# Patient Record
Sex: MALE | Race: AMERICAN INDIAN OR ALASKA NATIVE | Employment: FULL TIME | URBAN - METROPOLITAN AREA
[De-identification: names, ages, dates, MRNs, and addresses within clinical notes are randomized per-mention and may not be internally consistent; named-entity substitution may affect disease eponyms.]

---

## 2017-01-27 ENCOUNTER — OFFICE VISIT (OUTPATIENT)
Dept: INTERNAL MEDICINE | Facility: CLINIC | Age: 37
End: 2017-01-27
Payer: COMMERCIAL

## 2017-01-27 VITALS
WEIGHT: 200 LBS | OXYGEN SATURATION: 97 % | SYSTOLIC BLOOD PRESSURE: 110 MMHG | TEMPERATURE: 97.8 F | HEIGHT: 71 IN | BODY MASS INDEX: 28 KG/M2 | HEART RATE: 86 BPM | DIASTOLIC BLOOD PRESSURE: 70 MMHG

## 2017-01-27 DIAGNOSIS — J01.01 ACUTE RECURRENT MAXILLARY SINUSITIS: Primary | ICD-10-CM

## 2017-01-27 PROCEDURE — 99213 OFFICE O/P EST LOW 20 MIN: CPT | Performed by: INTERNAL MEDICINE

## 2017-01-27 RX ORDER — FLUTICASONE PROPIONATE 50 MCG
1-2 SPRAY, SUSPENSION (ML) NASAL DAILY
Qty: 1 BOTTLE | Refills: 11 | Status: SHIPPED | OUTPATIENT
Start: 2017-01-27 | End: 2018-01-01

## 2017-01-27 NOTE — PROGRESS NOTES
"  SUBJECTIVE:                                                    Jose Lantigua is a 36 year old male who presents to clinic today for the following health issues:      Sinus infections/ tonsillitis     Presents with concern for recurrent pharyngitis, tonsillitis. Has had 2 -3 episodes per season. Has chronic nasal congestion. No current acute symptoms of cough, sore throat, SOB, HA.   No h/o pneumonias, no complications from prior infections.       Problem list and histories reviewed & adjusted, as indicated.  Additional history: none    Problem list, Medication list, Allergies, and Medical/Social/Surgical histories reviewed in EPIC and updated as appropriate.    ROS:  Constitutional, HEENT, cardiovascular, pulmonary, gi and gu systems are negative, except as otherwise noted.    OBJECTIVE:                                                    /70 mmHg  Pulse 86  Temp(Src) 97.8  F (36.6  C) (Oral)  Ht 5' 10.5\" (1.791 m)  Wt 200 lb (90.719 kg)  BMI 28.28 kg/m2  SpO2 97%  Body mass index is 28.28 kg/(m^2).  GENERAL: healthy, alert and no distress  NECK: no adenopathy, no asymmetry, masses, or scars and thyroid normal to palpation  RESP: lungs clear to auscultation - no rales, rhonchi or wheezes  CV: regular rate and rhythm, normal S1 S2, no S3 or S4, no murmur, click or rub, no peripheral edema and peripheral pulses strong  ABDOMEN: soft, nontender, no hepatosplenomegaly, no masses and bowel sounds normal  MS: no gross musculoskeletal defects noted, no edema    Diagnostic Test Results:  none      ASSESSMENT/PLAN:                                                      Problem List Items Addressed This Visit     None      Visit Diagnoses     Acute recurrent maxillary sinusitis    -  Primary     Relevant Medications     fluticasone (FLONASE) 50 MCG/ACT spray     Other Relevant Orders     OTOLARYNGOLOGY REFERRAL            Start on nasal steroid  Refer to ENT     Follow-Up:as needed     Musa Florez, " MD  [facility]

## 2017-01-27 NOTE — NURSING NOTE
"Chief Complaint   Patient presents with     Consult     about tonsilectomy       Initial /70 mmHg  Pulse 86  Temp(Src) 97.8  F (36.6  C) (Oral)  Ht 5' 10.5\" (1.791 m)  Wt 200 lb (90.719 kg)  BMI 28.28 kg/m2  SpO2 97% Estimated body mass index is 28.28 kg/(m^2) as calculated from the following:    Height as of this encounter: 5' 10.5\" (1.791 m).    Weight as of this encounter: 200 lb (90.719 kg).  BP completed using cuff size: large    "

## 2017-01-27 NOTE — MR AVS SNAPSHOT
After Visit Summary   1/27/2017    Jose Lantigua    MRN: [id number]           Patient Information     Date Of Birth          1980        Visit Information        Provider Department      1/27/2017 3:40 PM Musa Florez MD [facility]        Today's Diagnoses     Acute recurrent maxillary sinusitis    -  1        Follow-ups after your visit        Additional Services     OTOLARYNGOLOGY REFERRAL       Your provider has referred you to: N: [facility] [phone]   [email or web address]    Please be aware that coverage of these services is subject to the terms and limitations of your health insurance plan.  Call member services at your health plan with any benefit or coverage questions.      Please bring the following with you to your appointment:    (1) Any X-Rays, CTs or MRIs which have been performed.  Contact the facility where they were done to arrange for  prior to your scheduled appointment.   (2) List of current medications  (3) This referral request   (4) Any documents/labs given to you for this referral                  Who to contact     If you have questions or need follow up information about today's clinic visit or your schedule please contact [facility] directly at [phone].  Normal or non-critical lab and imaging results will be communicated to you by MyChart, letter or phone within 4 business days after the clinic has received the results. If you do not hear from us within 7 days, please contact the clinic through MyChart or phone. If you have a critical or abnormal lab result, we will notify you by phone as soon as possible.  Submit refill requests through "[employer] or call your pharmacy and they will forward the refill request to us. Please allow 3 business days for your refill to be completed.          Additional Information About Your Visit       "  Aunt Kitchenhart Information     [employer] lets you send messages to your doctor, view your test results, renew your prescriptions, schedule appointments and more. To sign up, go to [email or web address] . Click on \"Log in\" on the left side of the screen, which will take you to the Welcome page. Then click on \"Sign up Now\" on the right side of the page.     You will be asked to enter the access code listed below, as well as some personal information. Please follow the directions to create your username and password.     Your access code is: [email or web address]  Expires: 2017 12:03 PM     Your access code will  in 90 days. If you need help or a new code, please call your [place] clinic or [phone].        Care EveryWhere ID     This is your Care EveryWhere ID. This could be used by other organizations to access your [place] medical records  [id number]        Your Vitals Were     Pulse Temperature Height BMI (Body Mass Index) Pulse Oximetry       86 97.8  F (36.6  C) (Oral) 5' 10.5\" (1.791 m) 28.28 kg/m2 97%        Blood Pressure from Last 3 Encounters:   17 110/70   16 151/95   11/15/16 116/84    Weight from Last 3 Encounters:   17 200 lb (90.719 kg)   11/15/16 199 lb (90.266 kg)   16 199 lb (90.266 kg)              We Performed the Following     OTOLARYNGOLOGY REFERRAL          Today's Medication Changes          These changes are accurate as of: 17  4:09 PM.  If you have any questions, ask your nurse or doctor.               Start taking these medicines.        Dose/Directions    fluticasone 50 MCG/ACT spray   Commonly known as:  FLONASE   Used for:  Acute recurrent maxillary sinusitis   Started by:  Musa Florez MD        Dose:  1-2 spray   Spray 1-2 sprays into both nostrils daily   Quantity:  1 Bottle   Refills:  11            Where to get your medicines      These medications were sent to [place] [address] [place] [facility], [place] - [address]  [address], " [facility] [address]     Phone:  [phone]    - fluticasone 50 MCG/ACT spray             Primary Care Provider Office Phone # Fax #    Musa Florez [phone]       [facility] [address] [facility] [address]        Thank you!     Thank you for choosing [facility]  for your care. Our goal is always to provide you with excellent care. Hearing back from our patients is one way we can continue to improve our services. Please take a few minutes to complete the written survey that you may receive in the mail after your visit with us. Thank you!             Your Updated Medication List - Protect others around you: Learn how to safely use, store and throw away your medicines at www.disposemymeds.org.          This list is accurate as of: 1/27/17  4:09 PM.  Always use your most recent med list.                   Brand Name Dispense Instructions for use    fluticasone 50 MCG/ACT spray    FLONASE    1 Bottle    Spray 1-2 sprays into both nostrils daily       ibuprofen 200 MG tablet    ADVIL/MOTRIN     Take 200 mg by mouth every 4 hours as needed for mild pain

## 2018-01-01 ENCOUNTER — HOSPITAL ENCOUNTER (OUTPATIENT)
Dept: CT IMAGING | Facility: CLINIC | Age: 38
Discharge: HOME | End: 2018-12-05
Attending: INTERNAL MEDICINE | Admitting: INTERNAL MEDICINE
Payer: COMMERCIAL

## 2018-01-01 ENCOUNTER — TELEPHONE (OUTPATIENT)
Dept: ORTHOPEDICS | Facility: CLINIC | Age: 38
End: 2018-01-01

## 2018-01-01 ENCOUNTER — TELEPHONE (OUTPATIENT)
Dept: INTERNAL MEDICINE | Facility: CLINIC | Age: 38
End: 2018-01-01

## 2018-01-01 ENCOUNTER — OFFICE VISIT (OUTPATIENT)
Dept: INTERNAL MEDICINE | Facility: CLINIC | Age: 38
End: 2018-01-01
Payer: COMMERCIAL

## 2018-01-01 ENCOUNTER — MYC REFILL (OUTPATIENT)
Dept: INTERNAL MEDICINE | Facility: CLINIC | Age: 38
End: 2018-01-01

## 2018-01-01 VITALS
RESPIRATION RATE: 20 BRPM | SYSTOLIC BLOOD PRESSURE: 120 MMHG | TEMPERATURE: 98.2 F | WEIGHT: 223 LBS | HEART RATE: 107 BPM | HEIGHT: 71 IN | OXYGEN SATURATION: 99 % | DIASTOLIC BLOOD PRESSURE: 90 MMHG | BODY MASS INDEX: 31.22 KG/M2

## 2018-01-01 VITALS
WEIGHT: 227 LBS | BODY MASS INDEX: 32.5 KG/M2 | DIASTOLIC BLOOD PRESSURE: 80 MMHG | TEMPERATURE: 98.1 F | SYSTOLIC BLOOD PRESSURE: 124 MMHG | OXYGEN SATURATION: 98 % | HEART RATE: 88 BPM | RESPIRATION RATE: 16 BRPM | HEIGHT: 70 IN

## 2018-01-01 VITALS
DIASTOLIC BLOOD PRESSURE: 68 MMHG | WEIGHT: 229 LBS | TEMPERATURE: 97.8 F | HEART RATE: 98 BPM | BODY MASS INDEX: 32.78 KG/M2 | SYSTOLIC BLOOD PRESSURE: 108 MMHG | HEIGHT: 70 IN | OXYGEN SATURATION: 99 %

## 2018-01-01 DIAGNOSIS — I10 ESSENTIAL HYPERTENSION, BENIGN: ICD-10-CM

## 2018-01-01 DIAGNOSIS — Z11.3 SCREEN FOR STD (SEXUALLY TRANSMITTED DISEASE): ICD-10-CM

## 2018-01-01 DIAGNOSIS — Z09 HOSPITAL DISCHARGE FOLLOW-UP: ICD-10-CM

## 2018-01-01 DIAGNOSIS — R10.32 ABDOMINAL PAIN, LEFT LOWER QUADRANT: Primary | ICD-10-CM

## 2018-01-01 DIAGNOSIS — M54.50 MIDLINE LOW BACK PAIN WITHOUT SCIATICA, UNSPECIFIED CHRONICITY: ICD-10-CM

## 2018-01-01 DIAGNOSIS — R16.1 SPLENOMEGALY: ICD-10-CM

## 2018-01-01 DIAGNOSIS — M54.50 ACUTE BILATERAL LOW BACK PAIN WITHOUT SCIATICA: Primary | ICD-10-CM

## 2018-01-01 DIAGNOSIS — R10.32 ABDOMINAL PAIN, LEFT LOWER QUADRANT: ICD-10-CM

## 2018-01-01 DIAGNOSIS — F41.9 ANXIETY: ICD-10-CM

## 2018-01-01 LAB
ALBUMIN SERPL-MCNC: 3.8 G/DL (ref 3.4–5)
ALBUMIN UR-MCNC: NEGATIVE MG/DL
ALP SERPL-CCNC: 68 U/L (ref 40–150)
ALT SERPL W P-5'-P-CCNC: 51 U/L (ref 0–70)
ANION GAP SERPL CALCULATED.3IONS-SCNC: 5 MMOL/L (ref 3–14)
ANION GAP SERPL CALCULATED.3IONS-SCNC: 9 MMOL/L (ref 3–14)
APPEARANCE UR: CLEAR
AST SERPL W P-5'-P-CCNC: 23 U/L (ref 0–45)
BILIRUB SERPL-MCNC: 0.5 MG/DL (ref 0.2–1.3)
BILIRUB UR QL STRIP: NEGATIVE
BUN SERPL-MCNC: 27 MG/DL (ref 7–30)
BUN SERPL-MCNC: 29 MG/DL (ref 7–30)
C TRACH DNA SPEC QL NAA+PROBE: NEGATIVE
CALCIUM SERPL-MCNC: 8.5 MG/DL (ref 8.5–10.1)
CALCIUM SERPL-MCNC: 9.4 MG/DL (ref 8.5–10.1)
CHLORIDE SERPL-SCNC: 103 MMOL/L (ref 94–109)
CHLORIDE SERPL-SCNC: 105 MMOL/L (ref 94–109)
CO2 SERPL-SCNC: 28 MMOL/L (ref 20–32)
CO2 SERPL-SCNC: 29 MMOL/L (ref 20–32)
COLOR UR AUTO: YELLOW
CREAT SERPL-MCNC: 1.27 MG/DL (ref 0.66–1.25)
CREAT SERPL-MCNC: 1.27 MG/DL (ref 0.66–1.25)
ERYTHROCYTE [DISTWIDTH] IN BLOOD BY AUTOMATED COUNT: 13.9 % (ref 10–15)
ERYTHROCYTE [DISTWIDTH] IN BLOOD BY AUTOMATED COUNT: 14 % (ref 10–15)
GFR SERPL CREATININE-BSD FRML MDRD: 63 ML/MIN/1.7M2
GFR SERPL CREATININE-BSD FRML MDRD: 63 ML/MIN/1.7M2
GLUCOSE SERPL-MCNC: 66 MG/DL (ref 70–99)
GLUCOSE SERPL-MCNC: 97 MG/DL (ref 70–99)
GLUCOSE UR STRIP-MCNC: NEGATIVE MG/DL
HCT VFR BLD AUTO: 42.4 % (ref 40–53)
HCT VFR BLD AUTO: 45.1 % (ref 40–53)
HCV AB SERPL QL IA: NONREACTIVE
HGB BLD-MCNC: 15.1 G/DL (ref 13.3–17.7)
HGB BLD-MCNC: 15.2 G/DL (ref 13.3–17.7)
HGB UR QL STRIP: NEGATIVE
HIV 1+2 AB+HIV1 P24 AG SERPL QL IA: NONREACTIVE
KETONES UR STRIP-MCNC: NEGATIVE MG/DL
LEUKOCYTE ESTERASE UR QL STRIP: NEGATIVE
LIPASE SERPL-CCNC: 153 U/L (ref 73–393)
MCH RBC QN AUTO: 28.9 PG (ref 26.5–33)
MCH RBC QN AUTO: 30.3 PG (ref 26.5–33)
MCHC RBC AUTO-ENTMCNC: 33.7 G/DL (ref 31.5–36.5)
MCHC RBC AUTO-ENTMCNC: 35.6 G/DL (ref 31.5–36.5)
MCV RBC AUTO: 85 FL (ref 78–100)
MCV RBC AUTO: 86 FL (ref 78–100)
N GONORRHOEA DNA SPEC QL NAA+PROBE: NEGATIVE
NITRATE UR QL: NEGATIVE
PH UR STRIP: 5.5 PH (ref 5–7)
PLATELET # BLD AUTO: 123 10E9/L (ref 150–450)
PLATELET # BLD AUTO: 153 10E9/L (ref 150–450)
POTASSIUM SERPL-SCNC: 3.8 MMOL/L (ref 3.4–5.3)
POTASSIUM SERPL-SCNC: 4 MMOL/L (ref 3.4–5.3)
PROT SERPL-MCNC: 7.4 G/DL (ref 6.8–8.8)
RBC # BLD AUTO: 4.99 10E12/L (ref 4.4–5.9)
RBC # BLD AUTO: 5.26 10E12/L (ref 4.4–5.9)
SODIUM SERPL-SCNC: 138 MMOL/L (ref 133–144)
SODIUM SERPL-SCNC: 141 MMOL/L (ref 133–144)
SOURCE: NORMAL
SP GR UR STRIP: 1.02 (ref 1–1.03)
SPECIMEN SOURCE: NORMAL
SPECIMEN SOURCE: NORMAL
T PALLIDUM AB SER QL: NONREACTIVE
UROBILINOGEN UR STRIP-ACNC: 0.2 EU/DL (ref 0.2–1)
WBC # BLD AUTO: 5.8 10E9/L (ref 4–11)
WBC # BLD AUTO: 6 10E9/L (ref 4–11)

## 2018-01-01 PROCEDURE — 74177 CT ABD & PELVIS W/CONTRAST: CPT

## 2018-01-01 PROCEDURE — 86803 HEPATITIS C AB TEST: CPT | Performed by: INTERNAL MEDICINE

## 2018-01-01 PROCEDURE — 87591 N.GONORRHOEAE DNA AMP PROB: CPT | Performed by: INTERNAL MEDICINE

## 2018-01-01 PROCEDURE — 36415 COLL VENOUS BLD VENIPUNCTURE: CPT | Performed by: INTERNAL MEDICINE

## 2018-01-01 PROCEDURE — 85027 COMPLETE CBC AUTOMATED: CPT | Performed by: INTERNAL MEDICINE

## 2018-01-01 PROCEDURE — 86780 TREPONEMA PALLIDUM: CPT | Performed by: INTERNAL MEDICINE

## 2018-01-01 PROCEDURE — 25000128 H RX IP 250 OP 636: Performed by: RADIOLOGY

## 2018-01-01 PROCEDURE — 81003 URINALYSIS AUTO W/O SCOPE: CPT | Performed by: INTERNAL MEDICINE

## 2018-01-01 PROCEDURE — 99214 OFFICE O/P EST MOD 30 MIN: CPT | Performed by: INTERNAL MEDICINE

## 2018-01-01 PROCEDURE — 87491 CHLMYD TRACH DNA AMP PROBE: CPT | Performed by: INTERNAL MEDICINE

## 2018-01-01 PROCEDURE — 83690 ASSAY OF LIPASE: CPT | Performed by: INTERNAL MEDICINE

## 2018-01-01 PROCEDURE — 87389 HIV-1 AG W/HIV-1&-2 AB AG IA: CPT | Performed by: INTERNAL MEDICINE

## 2018-01-01 PROCEDURE — 80053 COMPREHEN METABOLIC PANEL: CPT | Performed by: INTERNAL MEDICINE

## 2018-01-01 PROCEDURE — 80048 BASIC METABOLIC PNL TOTAL CA: CPT | Performed by: INTERNAL MEDICINE

## 2018-01-01 RX ORDER — TRAMADOL HYDROCHLORIDE 50 MG/1
50 TABLET ORAL EVERY 6 HOURS PRN
Qty: 30 TABLET | Refills: 0 | Status: SHIPPED | OUTPATIENT
Start: 2018-01-01

## 2018-01-01 RX ORDER — IBUPROFEN 800 MG/1
800 TABLET, FILM COATED ORAL EVERY 8 HOURS PRN
Qty: 60 TABLET | Refills: 1 | Status: SHIPPED | OUTPATIENT
Start: 2018-01-01 | End: 2019-01-01

## 2018-01-01 RX ORDER — POLYDEXTROSE 1.5 G
TABLET,CHEWABLE ORAL 2 TIMES DAILY
COMMUNITY
Start: 2018-01-01

## 2018-01-01 RX ORDER — IOPAMIDOL 755 MG/ML
500 INJECTION, SOLUTION INTRAVASCULAR ONCE
Status: COMPLETED | OUTPATIENT
Start: 2018-01-01 | End: 2018-01-01

## 2018-01-01 RX ORDER — LOSARTAN POTASSIUM AND HYDROCHLOROTHIAZIDE 12.5; 5 MG/1; MG/1
1 TABLET ORAL DAILY
Qty: 90 TABLET | Refills: 3 | Status: SHIPPED | OUTPATIENT
Start: 2018-01-01 | End: 2019-01-01

## 2018-01-01 RX ORDER — HYDROXYZINE HYDROCHLORIDE 25 MG/1
25-50 TABLET, FILM COATED ORAL EVERY 6 HOURS PRN
Qty: 30 TABLET | Refills: 1 | Status: SHIPPED | OUTPATIENT
Start: 2018-01-01

## 2018-01-01 RX ADMIN — IOPAMIDOL 100 ML: 755 INJECTION, SOLUTION INTRAVENOUS at 11:05

## 2018-01-01 RX ADMIN — SODIUM CHLORIDE 55 ML: 9 INJECTION, SOLUTION INTRAVENOUS at 11:05

## 2018-01-01 ASSESSMENT — MIFFLIN-ST. JEOR: SCORE: 1963.96

## 2018-02-07 ENCOUNTER — OFFICE VISIT (OUTPATIENT)
Dept: INTERNAL MEDICINE | Facility: CLINIC | Age: 38
End: 2018-02-07
Payer: COMMERCIAL

## 2018-02-07 ENCOUNTER — RADIANT APPOINTMENT (OUTPATIENT)
Dept: GENERAL RADIOLOGY | Facility: CLINIC | Age: 38
End: 2018-02-07
Attending: INTERNAL MEDICINE
Payer: COMMERCIAL

## 2018-02-07 VITALS
DIASTOLIC BLOOD PRESSURE: 80 MMHG | HEART RATE: 88 BPM | WEIGHT: 222.2 LBS | OXYGEN SATURATION: 96 % | HEIGHT: 71 IN | TEMPERATURE: 98.1 F | BODY MASS INDEX: 31.11 KG/M2 | SYSTOLIC BLOOD PRESSURE: 110 MMHG

## 2018-02-07 DIAGNOSIS — R06.02 SOB (SHORTNESS OF BREATH): ICD-10-CM

## 2018-02-07 DIAGNOSIS — R05.9 COUGH: Primary | ICD-10-CM

## 2018-02-07 DIAGNOSIS — R05.9 COUGH: ICD-10-CM

## 2018-02-07 DIAGNOSIS — Z00.00 ROUTINE GENERAL MEDICAL EXAMINATION AT A HEALTH CARE FACILITY: ICD-10-CM

## 2018-02-07 LAB
BASOPHILS # BLD AUTO: 0 10E9/L (ref 0–0.2)
BASOPHILS NFR BLD AUTO: 0 %
DIFFERENTIAL METHOD BLD: ABNORMAL
EOSINOPHIL # BLD AUTO: 0.1 10E9/L (ref 0–0.7)
EOSINOPHIL NFR BLD AUTO: 1.1 %
ERYTHROCYTE [DISTWIDTH] IN BLOOD BY AUTOMATED COUNT: 14.5 % (ref 10–15)
FLUAV+FLUBV AG SPEC QL: NEGATIVE
FLUAV+FLUBV AG SPEC QL: NEGATIVE
HCT VFR BLD AUTO: 43.7 % (ref 40–53)
HGB BLD-MCNC: 15 G/DL (ref 13.3–17.7)
LYMPHOCYTES # BLD AUTO: 1.3 10E9/L (ref 0.8–5.3)
LYMPHOCYTES NFR BLD AUTO: 13.3 %
MCH RBC QN AUTO: 28.7 PG (ref 26.5–33)
MCHC RBC AUTO-ENTMCNC: 34.3 G/DL (ref 31.5–36.5)
MCV RBC AUTO: 84 FL (ref 78–100)
MONOCYTES # BLD AUTO: 0.8 10E9/L (ref 0–1.3)
MONOCYTES NFR BLD AUTO: 7.9 %
NEUTROPHILS # BLD AUTO: 7.7 10E9/L (ref 1.6–8.3)
NEUTROPHILS NFR BLD AUTO: 77.7 %
PLATELET # BLD AUTO: 129 10E9/L (ref 150–450)
RBC # BLD AUTO: 5.22 10E12/L (ref 4.4–5.9)
SPECIMEN SOURCE: NORMAL
WBC # BLD AUTO: 9.9 10E9/L (ref 4–11)

## 2018-02-07 PROCEDURE — 36415 COLL VENOUS BLD VENIPUNCTURE: CPT | Performed by: INTERNAL MEDICINE

## 2018-02-07 PROCEDURE — 71046 X-RAY EXAM CHEST 2 VIEWS: CPT

## 2018-02-07 PROCEDURE — 87804 INFLUENZA ASSAY W/OPTIC: CPT | Performed by: INTERNAL MEDICINE

## 2018-02-07 PROCEDURE — 80061 LIPID PANEL: CPT | Performed by: INTERNAL MEDICINE

## 2018-02-07 PROCEDURE — 80050 GENERAL HEALTH PANEL: CPT | Performed by: INTERNAL MEDICINE

## 2018-02-07 PROCEDURE — 83721 ASSAY OF BLOOD LIPOPROTEIN: CPT | Mod: 59 | Performed by: INTERNAL MEDICINE

## 2018-02-07 PROCEDURE — 99213 OFFICE O/P EST LOW 20 MIN: CPT | Performed by: INTERNAL MEDICINE

## 2018-02-07 RX ORDER — CODEINE PHOSPHATE AND GUAIFENESIN 10; 100 MG/5ML; MG/5ML
1 SOLUTION ORAL EVERY 4 HOURS PRN
Qty: 120 ML | Refills: 0 | Status: SHIPPED | OUTPATIENT
Start: 2018-02-07 | End: 2018-01-01

## 2018-02-07 NOTE — NURSING NOTE
"Chief Complaint   Patient presents with     Cough     Pneumonia       Initial /80 (BP Location: Left arm, Patient Position: Sitting, Cuff Size: Adult Regular)  Pulse 88  Temp 98.1  F (36.7  C) (Oral)  Ht 5' 10.5\" (1.791 m)  Wt 222 lb 3.2 oz (100.8 kg)  SpO2 96%  BMI 31.43 kg/m2 Estimated body mass index is 31.43 kg/(m^2) as calculated from the following:    Height as of this encounter: 5' 10.5\" (1.791 m).    Weight as of this encounter: 222 lb 3.2 oz (100.8 kg).  Medication Reconciliation: complete   IA SMA    "

## 2018-02-07 NOTE — PROGRESS NOTES
SUBJECTIVE:   Jose Lantigua is a 37 year old male who presents to clinic today for the following health issues:    Patient presents with cold symptoms for 3-4 days. Has sore throat, nasal congestion. Has cough - non productive. Has headache. Chest hurts with the coughing. No fever. Has had  SOB. Feels tired , exhausted.   Has mid chest pain with breathing. Trying to rest past 2 days.     Has had frequent URI, Pneumonia and sinusitis    Problem list and histories reviewed & adjusted, as indicated.  Additional history: as documented    Patient Active Problem List   Diagnosis     CARDIOVASCULAR SCREENING; LDL GOAL LESS THAN 160     Essential hypertension, benign     Fracture of acromial end of left clavicle     Bladder disorder, other     Clavicle fracture     Anxiety     Hypothyroidism     Past Surgical History:   Procedure Laterality Date     ARTHROSCOPIC REPAIR MEDIAL COLLATERAL LIGAMENT  6/26/2012    Procedure: ARTHROSCOPIC REPAIR MEDIAL COLLATERAL LIGAMENT;  Right Knee Arthroscopic Loose Body Removal, Posterior Lateral Corner Reconstruction With Allograft ;  Surgeon: Esdras Etienne MD;  Location: [facility]      TOOTH EXTRACTION W/FORCEP       NO HISTORY OF SURGERY       OPEN REDUCTION INTERNAL FIXATION CLAVICLE Left 11/20/2015    Procedure: OPEN REDUCTION INTERNAL FIXATION CLAVICLE;  Surgeon: Esdras Etienne MD;  Location:  OR       Social History   Substance Use Topics     Smoking status: Former Smoker     Packs/day: 1.50     Years: 2.00     Quit date: 6/11/2003     Smokeless tobacco: Never Used     Alcohol use 3.5 oz/week     7 Cans of beer per week      Comment: rarely     Family History   Problem Relation Age of Onset     Adopted: Yes     Family History Negative Mother      Family History Negative Father          Current Outpatient Prescriptions   Medication Sig Dispense Refill     ibuprofen (ADVIL,MOTRIN) 200 MG tablet Take 200 mg by mouth every 4 hours as needed for mild pain   "     fluticasone (FLONASE) 50 MCG/ACT spray Spray 1-2 sprays into both nostrils daily (Patient not taking: Reported on 2/7/2018) 1 Bottle 11       Reviewed and updated as needed this visit by clinical staff  Tobacco  Allergies  Meds  Med Hx  Surg Hx  Fam Hx  Soc Hx      Reviewed and updated as needed this visit by Provider         ROS:  Constitutional, HEENT, cardiovascular, pulmonary, gi and gu systems are negative, except as otherwise noted.    OBJECTIVE:     /80 (BP Location: Left arm, Patient Position: Sitting, Cuff Size: Adult Regular)  Pulse 88  Temp 98.1  F (36.7  C) (Oral)  Ht 5' 10.5\" (1.791 m)  Wt 222 lb 3.2 oz (100.8 kg)  SpO2 96%  BMI 31.43 kg/m2  Body mass index is 31.43 kg/(m^2).   GENERAL: healthy, alert and no distress  EYES: Eyes grossly normal to inspection, PERRL and conjunctivae and sclerae normal  HENT: ear canals and TM's normal, nose with mild congestion, erythema, secretions and mouth without ulcers or lesions  NECK: no adenopathy, no asymmetry, masses, or scars and thyroid normal to palpation  RESP: lungs clear to auscultation - no rales, rhonchi or wheezes  CV: regular rate and rhythm, normal S1 S2, no S3 or S4, no murmur, click or rub, no peripheral edema and peripheral pulses strong  ABDOMEN: soft, nontender, no hepatosplenomegaly, no masses and bowel sounds normal  MS: no gross musculoskeletal defects noted, no edema    Diagnostic Test Results:  none     ASSESSMENT/PLAN:     Problem List Items Addressed This Visit     None      Visit Diagnoses     Cough    -  Primary    Relevant Orders    Influenza A/B antigen    XR Chest 2 Views    SOB (shortness of breath)        Relevant Orders    Influenza A/B antigen    XR Chest 2 Views           Assess influenza test and CXr  Symptomatic treatment       Follow-Up:if not improved in one week     Musa Florez MD  [facility]    "

## 2018-02-07 NOTE — MR AVS SNAPSHOT
"              After Visit Summary   2/7/2018    Jose Lantigua    MRN: [id number]           Patient Information     Date Of Birth          1980        Visit Information        Provider Department      2/7/2018 1:40 PM Musa Florez MD [facility]        Today's Diagnoses     Cough    -  1    SOB (shortness of breath)           Follow-ups after your visit        Your next 10 appointments already scheduled     Feb 07, 2018  2:20 PM CST   (Arrive by 2:05 PM)   XR CHEST 2 VIEWS with RIXR1   [facility]    [address]  [facility] [address] [phone]           Please bring a list of your current medicines to your exam. (Include vitamins, minerals and over-thecounter medicines.) Leave your valuables at home.  Tell your doctor if there is a chance you may be pregnant.  You do not need to do anything special for this exam.              Who to contact     If you have questions or need follow up information about today's clinic visit or your schedule please contact [facility] directly at [phone].  Normal or non-critical lab and imaging results will be communicated to you by Mobifusionhart, letter or phone within 4 business days after the clinic has received the results. If you do not hear from us within 7 days, please contact the clinic through [employer] or phone. If you have a critical or abnormal lab result, we will notify you by phone as soon as possible.  Submit refill requests through [employer] or call your pharmacy and they will forward the refill request to us. Please allow 3 business days for your refill to be completed.          Additional Information About Your Visit        MyChart Information     [employer] lets you send messages to your doctor, view your test results, renew your prescriptions, schedule appointments and more. To sign up, go to [email or web address] . Click on \"Log in\" on the left side of the " "screen, which will take you to the Welcome page. Then click on \"Sign up Now\" on the right side of the page.     You will be asked to enter the access code listed below, as well as some personal information. Please follow the directions to create your username and password.     Your access code is: [email or web address]  Expires: 2018  2:16 PM     Your access code will  in 90 days. If you need help or a new code, please call your [place] clinic or [phone].        Care EveryWhere ID     This is your Care EveryWhere ID. This could be used by other organizations to access your [place] medical records  [id number]        Your Vitals Were     Pulse Temperature Height Pulse Oximetry BMI (Body Mass Index)       88 98.1  F (36.7  C) (Oral) 5' 10.5\" (1.791 m) 96% 31.43 kg/m2        Blood Pressure from Last 3 Encounters:   18 110/80   17 110/70   16 (!) 151/95    Weight from Last 3 Encounters:   18 222 lb 3.2 oz (100.8 kg)   17 200 lb (90.7 kg)   11/15/16 199 lb (90.3 kg)              We Performed the Following     Influenza A/B antigen        Primary Care Provider Office Phone # Fax #    Musa Florez [phone]       [address] [facility] [address]        Equal Access to Services     [facility]: Hadii jack ku hadasho Sodyllanali, waaxda luqadaha, qaybta kaalmada anthony, elio ji . So [facility] [phone].    ATENCIÓN: Si habla español, tiene a silva disposición servicios gratuitos de asistencia lingüística. Llame al [phone].    We comply with applicable federal civil rights laws and [place] laws. We do not discriminate on the basis of race, color, national origin, age, disability, sex, sexual orientation, or gender identity.            Thank you!     Thank you for choosing [facility]  for your care. Our goal is always to provide you with excellent care. Hearing back from our patients is one way we can " continue to improve our services. Please take a few minutes to complete the written survey that you may receive in the mail after your visit with us. Thank you!             Your Updated Medication List - Protect others around you: Learn how to safely use, store and throw away your medicines at www.disposemymeds.org.          This list is accurate as of 2/7/18  2:16 PM.  Always use your most recent med list.                   Brand Name Dispense Instructions for use Diagnosis    fluticasone 50 MCG/ACT spray    FLONASE    1 Bottle    Spray 1-2 sprays into both nostrils daily    Acute recurrent maxillary sinusitis       ibuprofen 200 MG tablet    ADVIL/MOTRIN     Take 200 mg by mouth every 4 hours as needed for mild pain

## 2018-02-08 LAB
ALBUMIN SERPL-MCNC: 3.7 G/DL (ref 3.4–5)
ALP SERPL-CCNC: 64 U/L (ref 40–150)
ALT SERPL W P-5'-P-CCNC: 34 U/L (ref 0–70)
ANION GAP SERPL CALCULATED.3IONS-SCNC: 9 MMOL/L (ref 3–14)
AST SERPL W P-5'-P-CCNC: 18 U/L (ref 0–45)
BILIRUB SERPL-MCNC: 0.6 MG/DL (ref 0.2–1.3)
BUN SERPL-MCNC: 17 MG/DL (ref 7–30)
CALCIUM SERPL-MCNC: 8.6 MG/DL (ref 8.5–10.1)
CHLORIDE SERPL-SCNC: 108 MMOL/L (ref 94–109)
CHOLEST SERPL-MCNC: 191 MG/DL
CO2 SERPL-SCNC: 25 MMOL/L (ref 20–32)
CREAT SERPL-MCNC: 1.28 MG/DL (ref 0.66–1.25)
GFR SERPL CREATININE-BSD FRML MDRD: 63 ML/MIN/1.7M2
GLUCOSE SERPL-MCNC: 88 MG/DL (ref 70–99)
HDLC SERPL-MCNC: 40 MG/DL
LDLC SERPL CALC-MCNC: ABNORMAL MG/DL
LDLC SERPL DIRECT ASSAY-MCNC: 97 MG/DL
NONHDLC SERPL-MCNC: 151 MG/DL
POTASSIUM SERPL-SCNC: 3.8 MMOL/L (ref 3.4–5.3)
PROT SERPL-MCNC: 7 G/DL (ref 6.8–8.8)
SODIUM SERPL-SCNC: 142 MMOL/L (ref 133–144)
TRIGL SERPL-MCNC: 502 MG/DL
TSH SERPL DL<=0.005 MIU/L-ACNC: 1.6 MU/L (ref 0.4–4)

## 2018-02-16 ENCOUNTER — OFFICE VISIT (OUTPATIENT)
Dept: INTERNAL MEDICINE | Facility: CLINIC | Age: 38
End: 2018-02-16
Payer: COMMERCIAL

## 2018-02-16 VITALS
HEIGHT: 71 IN | WEIGHT: 220 LBS | BODY MASS INDEX: 30.8 KG/M2 | TEMPERATURE: 98.7 F | SYSTOLIC BLOOD PRESSURE: 118 MMHG | DIASTOLIC BLOOD PRESSURE: 78 MMHG | HEART RATE: 108 BPM | OXYGEN SATURATION: 98 %

## 2018-02-16 DIAGNOSIS — S42.018S: ICD-10-CM

## 2018-02-16 DIAGNOSIS — J20.9 ACUTE BRONCHITIS, UNSPECIFIED ORGANISM: Primary | ICD-10-CM

## 2018-02-16 PROCEDURE — 99213 OFFICE O/P EST LOW 20 MIN: CPT | Performed by: INTERNAL MEDICINE

## 2018-02-16 RX ORDER — DOXYCYCLINE 100 MG/1
100 CAPSULE ORAL 2 TIMES DAILY
Qty: 20 CAPSULE | Refills: 0 | Status: SHIPPED | OUTPATIENT
Start: 2018-02-16 | End: 2018-01-01

## 2018-02-16 NOTE — NURSING NOTE
"Chief Complaint   Patient presents with     RECHECK     F/U on cough, still sxs, no energy, cough, SOB/difficulty breathing       Initial /78  Pulse 108  Temp 98.7  F (37.1  C) (Oral)  Ht 5' 10.5\" (1.791 m)  Wt 220 lb (99.8 kg)  SpO2 98%  BMI 31.12 kg/m2 Estimated body mass index is 31.12 kg/(m^2) as calculated from the following:    Height as of this encounter: 5' 10.5\" (1.791 m).    Weight as of this encounter: 220 lb (99.8 kg).  Medication Reconciliation: complete   Ayse Sahni MA      "

## 2018-02-16 NOTE — MR AVS SNAPSHOT
"              After Visit Summary   2018    Jose Lantigua    MRN: [id number]           Patient Information     Date Of Birth          1980        Visit Information        Provider Department      2018 11:20 AM Musa Florez MD [facility]        Today's Diagnoses     Acute bronchitis, unspecified organism    -  1    Closed nondisplaced fracture of sternal end of left clavicle, sequela           Follow-ups after your visit        Who to contact     If you have questions or need follow up information about today's clinic visit or your schedule please contact [facility] directly at [phone].  Normal or non-critical lab and imaging results will be communicated to you by GetOutfittedhart, letter or phone within 4 business days after the clinic has received the results. If you do not hear from us within 7 days, please contact the clinic through GetOutfittedhart or phone. If you have a critical or abnormal lab result, we will notify you by phone as soon as possible.  Submit refill requests through [employer] or call your pharmacy and they will forward the refill request to us. Please allow 3 business days for your refill to be completed.          Additional Information About Your Visit        MyChart Information     [employer] lets you send messages to your doctor, view your test results, renew your prescriptions, schedule appointments and more. To sign up, go to [email or web address] . Click on \"Log in\" on the left side of the screen, which will take you to the Welcome page. Then click on \"Sign up Now\" on the right side of the page.     You will be asked to enter the access code listed below, as well as some personal information. Please follow the directions to create your username and password.     Your access code is: [email or web address]  Expires: 2018  2:16 PM     Your access code will  in 90 days. If you need help or a new code, please call your [facility] or " "[phone].        Care EveryWhere ID     This is your Care EveryWhere ID. This could be used by other organizations to access your [place] medical records  [id number]        Your Vitals Were     Pulse Temperature Height Pulse Oximetry BMI (Body Mass Index)       108 98.7  F (37.1  C) (Oral) 5' 10.5\" (1.791 m) 98% 31.12 kg/m2        Blood Pressure from Last 3 Encounters:   02/16/18 118/78   02/07/18 110/80   01/27/17 110/70    Weight from Last 3 Encounters:   02/16/18 220 lb (99.8 kg)   02/07/18 222 lb 3.2 oz (100.8 kg)   01/27/17 200 lb (90.7 kg)              Today, you had the following     No orders found for display         Today's Medication Changes          These changes are accurate as of 2/16/18  1:25 PM.  If you have any questions, ask your nurse or doctor.               Start taking these medicines.        Dose/Directions    doxycycline 100 MG capsule   Commonly known as:  VIBRAMYCIN   Used for:  Acute bronchitis, unspecified organism   Started by:  Musa Florez MD        Dose:  100 mg   Take 1 capsule (100 mg) by mouth 2 times daily   Quantity:  20 capsule   Refills:  0            Where to get your medicines      These medications were sent to [place] [address] [place] [facility] [address]  [address] [facility] [address]     Phone:  [phone]     doxycycline 100 MG capsule                Primary Care Provider Office Phone # Fax #    Musa Florez [phone]       [address] [facility] [address]        Equal Access to Services     [facility]: Hadii jack Bowling, wacole alvarado, qaybbrian kaalelio [place]. So [facility] [phone].    ATENCIÓN: Si habla español, tiene a silva disposición servicios gratuitos de asistencia lingüística. Llame al [phone].    We comply with applicable federal civil rights laws and [place] laws. We do not discriminate on the basis of race, color, national " origin, age, disability, sex, sexual orientation, or gender identity.            Thank you!     Thank you for choosing [facility]  for your care. Our goal is always to provide you with excellent care. Hearing back from our patients is one way we can continue to improve our services. Please take a few minutes to complete the written survey that you may receive in the mail after your visit with us. Thank you!             Your Updated Medication List - Protect others around you: Learn how to safely use, store and throw away your medicines at www.disposemymeds.org.          This list is accurate as of 2/16/18  1:25 PM.  Always use your most recent med list.                   Brand Name Dispense Instructions for use Diagnosis    doxycycline 100 MG capsule    VIBRAMYCIN    20 capsule    Take 1 capsule (100 mg) by mouth 2 times daily    Acute bronchitis, unspecified organism       fluticasone 50 MCG/ACT spray    FLONASE    1 Bottle    Spray 1-2 sprays into both nostrils daily    Acute recurrent maxillary sinusitis       guaiFENesin-codeine 100-10 MG/5ML Soln solution    ROBITUSSIN AC    120 mL    Take 5 mLs by mouth every 4 hours as needed for cough    Cough       ibuprofen 200 MG tablet    ADVIL/MOTRIN     Take 200 mg by mouth every 4 hours as needed for mild pain

## 2018-02-16 NOTE — LETTER
[place] [address]  [facility] [address]  [phone]          February 16, 2018    RE:  Jose Lantigua                                                                                                                                                       [address] [facility] [address]            To whom it may concern:    Jose Lantigua is under my professional care.  Mr Lantigua had left clavicle fracture , requiring surgery with hardware.   He has pain in the left shoulder with mechanical pressure over the area.   Recommend accomodation on wearing a seat belt , keep it under the shoulder level because of his medical history.       Sincerely,        Musa Florez MD

## 2018-02-16 NOTE — PROGRESS NOTES
SUBJECTIVE:   Jose Lantigua is a 37 year old male who presents to clinic today for the following health issues:      Follow up on cough/SOB:    Patient is seen for a follow up visit.  Has had cough, phlegm production, nasal congestion, feels SOB. Initially non productive , now productive cough. Has mild wheezing, no chest pain, no fever. Tried symptomatic treatment. Getting worse.   CXR done 10 days ago was normal.       PROBLEMS TO ADD ON...  Has h/o left clavicular fracture, has pain with pressure over the area, prior surgery with hardware.   Has h/o hypertriglyceridemia. Discussed diet and exercise.     Problem list and histories reviewed & adjusted, as indicated.  Additional history: as documented    Patient Active Problem List   Diagnosis     CARDIOVASCULAR SCREENING; LDL GOAL LESS THAN 160     Essential hypertension, benign     Fracture of acromial end of left clavicle     Bladder disorder, other     Clavicle fracture     Anxiety     Hypothyroidism     Past Surgical History:   Procedure Laterality Date     ARTHROSCOPIC REPAIR MEDIAL COLLATERAL LIGAMENT  6/26/2012    Procedure: ARTHROSCOPIC REPAIR MEDIAL COLLATERAL LIGAMENT;  Right Knee Arthroscopic Loose Body Removal, Posterior Lateral Corner Reconstruction With Allograft ;  Surgeon: Esdras Etienne MD;  [facility]      TOOTH EXTRACTION W/FORCEP       NO HISTORY OF SURGERY       OPEN REDUCTION INTERNAL FIXATION CLAVICLE Left 11/20/2015    Procedure: OPEN REDUCTION INTERNAL FIXATION CLAVICLE;  Surgeon: Esdras Etienne MD;  [facility]       Social History   Substance Use Topics     Smoking status: Former Smoker     Packs/day: 1.50     Years: 2.00     Quit date: 6/11/2003     Smokeless tobacco: Never Used     Alcohol use 3.5 oz/week     7 Cans of beer per week      Comment: rarely     Family History   Problem Relation Age of Onset     Adopted: Yes     Family History Negative Mother      Family History Negative Father      "     Current Outpatient Prescriptions   Medication Sig Dispense Refill     doxycycline (VIBRAMYCIN) 100 MG capsule Take 1 capsule (100 mg) by mouth 2 times daily 20 capsule 0     ibuprofen (ADVIL,MOTRIN) 200 MG tablet Take 200 mg by mouth every 4 hours as needed for mild pain       guaiFENesin-codeine (ROBITUSSIN AC) 100-10 MG/5ML SOLN solution Take 5 mLs by mouth every 4 hours as needed for cough (Patient not taking: Reported on 2/16/2018) 120 mL 0     fluticasone (FLONASE) 50 MCG/ACT spray Spray 1-2 sprays into both nostrils daily (Patient not taking: Reported on 2/7/2018) 1 Bottle 11       Reviewed and updated as needed this visit by clinical staff  Tobacco  Allergies  Meds  Med Hx  Surg Hx  Fam Hx  Soc Hx      Reviewed and updated as needed this visit by Provider         ROS:  Constitutional, HEENT, cardiovascular, pulmonary, gi and gu systems are negative, except as otherwise noted.    OBJECTIVE:     /78  Pulse 108  Temp 98.7  F (37.1  C) (Oral)  Ht 5' 10.5\" (1.791 m)  Wt 220 lb (99.8 kg)  SpO2 98%  BMI 31.12 kg/m2  Body mass index is 31.12 kg/(m^2).   GENERAL: healthy, alert and no distress  EYES: Eyes grossly normal to inspection, PERRL and conjunctivae and sclerae normal  HENT: ear canals and TM's normal, nose - impaired breathing, edema of the mucosa and mouth without ulcers or lesions  NECK: no adenopathy, no asymmetry, masses, or scars and thyroid normal to palpation  RESP: lungs clear to auscultation - no rales, rhonchi , mild wheezes  CV: regular rate and rhythm, normal S1 S2, no S3 or S4, no murmur, click or rub, no peripheral edema and peripheral pulses strong  ABDOMEN: soft, nontender, no hepatosplenomegaly, no masses and bowel sounds normal  MS: no gross musculoskeletal defects noted, no edema    Diagnostic Test Results:  Results for orders placed or performed in visit on 02/07/18   XR Chest 2 Views    Narrative    CHEST TWO VIEWS 2/7/2018 2:21 PM     HISTORY:  Cough. SOB " (shortness of breath).    COMPARISON: None.     FINDINGS:  There are no acute infiltrates. The cardiac silhouette is  not enlarged. Pulmonary vasculature is unremarkable.       Impression    IMPRESSION: No acute disease.    LUISA GALLEGOS MD       ASSESSMENT/PLAN:     Problem List Items Addressed This Visit     Clavicle fracture      Other Visit Diagnoses     Acute bronchitis, unspecified organism    -  Primary    Relevant Medications    doxycycline (VIBRAMYCIN) 100 MG capsule           Start on antibiotic, advised side effects   Cont symptomatic treatment   Note for left clavicle fracture , chronic pain   Keep diet and exercise. Recheck lipids in 6 months, consider starting medication     Follow-Up:in 6 months     Musa Florez MD  [facility]

## 2018-03-16 ENCOUNTER — MYC MEDICAL ADVICE (OUTPATIENT)
Dept: INTERNAL MEDICINE | Facility: CLINIC | Age: 38
End: 2018-03-16

## 2018-03-16 DIAGNOSIS — I10 ESSENTIAL HYPERTENSION, BENIGN: ICD-10-CM

## 2018-03-16 DIAGNOSIS — F41.9 ANXIETY: ICD-10-CM

## 2018-03-19 ENCOUNTER — TELEPHONE (OUTPATIENT)
Dept: INTERNAL MEDICINE | Facility: CLINIC | Age: 38
End: 2018-03-19

## 2018-03-19 RX ORDER — LOSARTAN POTASSIUM AND HYDROCHLOROTHIAZIDE 12.5; 5 MG/1; MG/1
TABLET ORAL
Qty: 45 TABLET | Refills: 0 | Status: SHIPPED | OUTPATIENT
Start: 2018-03-19 | End: 2018-06-17

## 2018-03-19 RX ORDER — HYDROXYZINE HYDROCHLORIDE 25 MG/1
25-50 TABLET, FILM COATED ORAL EVERY 6 HOURS PRN
Qty: 30 TABLET | Refills: 1 | Status: SHIPPED | OUTPATIENT
Start: 2018-03-19 | End: 2018-01-01

## 2018-03-19 NOTE — TELEPHONE ENCOUNTER
[facility] in [facility] calls, states they received rx for Losartan-HCTZ. Asking if MD has addressed pt's Sulfa allergy and possibility for cross-allergy. Also wanted to verify dose as it's a low dose.     Please advise, thanks.

## 2018-03-19 NOTE — TELEPHONE ENCOUNTER
Pt requesting refills of losartan-hctz and hydroxyzine.  He was seen 2/26/18, /78.  He has physical sched 4/3/18.  Routing refill request to provider for review/approval because:  Labs out of range:  Cr elevated.

## 2018-06-17 DIAGNOSIS — I10 ESSENTIAL HYPERTENSION, BENIGN: ICD-10-CM

## 2018-06-21 NOTE — TELEPHONE ENCOUNTER
"Requested Prescriptions   Pending Prescriptions Disp Refills     losartan-hydrochlorothiazide (HYZAAR) 50-12.5 MG per tablet [Pharmacy Med Name: LOSARTAN-HCTZ 50-12.5 MG TAB]  Last Written Prescription Date: 3/19/2018   Last Fill Quantity: 45,  # refills: 0   Last office visit: 2/16/2018 with prescribing provider:     Future Office Visit:   45 tablet 0     Sig: TAKE 1/2 TABLET BY MOUTH DAILY    Angiotensin-II Receptors Failed    6/17/2018 12:07 PM       Failed - Normal serum creatinine on file in past 12 months    Recent Labs   Lab Test  02/07/18   1430   CR  1.28*            Passed - Blood pressure under 140/90 in past 12 months    BP Readings from Last 3 Encounters:   02/16/18 118/78   02/07/18 110/80   01/27/17 110/70                Passed - Recent (12 mo) or future (30 days) visit within the authorizing provider's specialty    Patient had office visit in the last 12 months or has a visit in the next 30 days with authorizing provider or within the authorizing provider's specialty.  See \"Patient Info\" tab in inbasket, or \"Choose Columns\" in Meds & Orders section of the refill encounter.           Passed - Patient is age 18 or older       Passed - Normal serum potassium on file in past 12 months    Recent Labs   Lab Test  02/07/18   1430   POTASSIUM  3.8                    "

## 2018-06-22 RX ORDER — LOSARTAN POTASSIUM AND HYDROCHLOROTHIAZIDE 12.5; 5 MG/1; MG/1
TABLET ORAL
Qty: 45 TABLET | Refills: 0 | Status: SHIPPED | OUTPATIENT
Start: 2018-06-22 | End: 2018-01-01

## 2018-09-04 NOTE — PROGRESS NOTES
SUBJECTIVE:                                                    Jose Lantigua is a 38 year old male who presents to clinic today for the following health issues:          ED/UC Followup:    Facility:  [facility]   Date of visit: 8/29/18  Reason for visit: back spasms /pain   Current Status: continues with pain          Patient is seen for a follow up visit.    Seen in ED for LBP. Has h/o chronic neck and back pain. Now worsened LBP, bilateral. Unable to bend down, feel back spasms, sharp , severe pain in the lower back, paravertebral.   No injury, falls, lifting.   Reports feet paresthesias, tingling since increased back pain. No change in urine and bowel control.   On Ibuprofen and Valium PRN. Valium makes him sleepy, Ibuprofen has not been helpful. Takes 200 mg every 4-6 hours.   Has h/o HTN, usually controlled, now elevated BP, likely related to acute pain.   Wants to have STD check. No symptoms reported.       Problem list and histories reviewed & adjusted, as indicated.  Additional history: as documented    Patient Active Problem List   Diagnosis     CARDIOVASCULAR SCREENING; LDL GOAL LESS THAN 160     Essential hypertension, benign     Fracture of acromial end of left clavicle     Bladder disorder, other     Clavicle fracture     Anxiety     Hypothyroidism     Past Surgical History:   Procedure Laterality Date     ARTHROSCOPIC REPAIR MEDIAL COLLATERAL LIGAMENT  6/26/2012    Procedure: ARTHROSCOPIC REPAIR MEDIAL COLLATERAL LIGAMENT;  Right Knee Arthroscopic Loose Body Removal, Posterior Lateral Corner Reconstruction With Allograft ;  Surgeon: Esdras Etienne MD;  Location:  OR      TOOTH EXTRACTION W/FORCEP       NO HISTORY OF SURGERY       OPEN REDUCTION INTERNAL FIXATION CLAVICLE Left 11/20/2015    Procedure: OPEN REDUCTION INTERNAL FIXATION CLAVICLE;  Surgeon: Esdras Etienne MD;  Location:  OR       Social History   Substance Use Topics     Smoking status: Former Smoker     " Packs/day: 1.50     Years: 2.00     Quit date: 6/11/2003     Smokeless tobacco: Never Used     Alcohol use 3.5 oz/week     7 Cans of beer per week      Comment: rarely     Family History   Problem Relation Age of Onset     Adopted: Yes     Family History Negative Mother      Family History Negative Father          Current Outpatient Prescriptions   Medication Sig Dispense Refill     fluticasone (FLONASE) 50 MCG/ACT spray Spray 1-2 sprays into both nostrils daily 1 Bottle 11     ibuprofen (ADVIL/MOTRIN) 800 MG tablet Take 1 tablet (800 mg) by mouth every 8 hours as needed for moderate pain 60 tablet 1     losartan-hydrochlorothiazide (HYZAAR) 50-12.5 MG per tablet TAKE 1/2 TABLET BY MOUTH DAILY 45 tablet 0     hydrOXYzine (ATARAX) 25 MG tablet Take 1-2 tablets (25-50 mg) by mouth every 6 hours as needed for anxiety 30 tablet 1     ibuprofen (ADVIL,MOTRIN) 200 MG tablet Take 200 mg by mouth every 4 hours as needed for mild pain         ROS:  Constitutional, HEENT, cardiovascular, pulmonary, gi and gu systems are negative, except as otherwise noted.    OBJECTIVE:     /90  Pulse 107  Temp 98.2  F (36.8  C) (Oral)  Resp 20  Ht 5' 10.5\" (1.791 m)  Wt 223 lb (101.2 kg)  SpO2 99%  BMI 31.54 kg/m2  Body mass index is 31.54 kg/(m^2).   GENERAL: healthy, alert and no distress  NECK: no adenopathy, no asymmetry, masses, or scars and thyroid normal to palpation  RESP: lungs clear to auscultation - no rales, rhonchi or wheezes  CV: regular rate and rhythm, normal S1 S2, no S3 or S4, no murmur, click or rub, no peripheral edema and peripheral pulses strong  ABDOMEN: soft, nontender, no hepatosplenomegaly, no masses and bowel sounds normal  MS: no gross musculoskeletal defects noted, no edema, LS spine paravertebral tenderness and muscle spasm     Diagnostic Test Results:  none     ASSESSMENT/PLAN:     Problem List Items Addressed This Visit     Essential hypertension, benign    Relevant Orders    CBC with platelets " (Completed)    Basic metabolic panel (Completed)      Other Visit Diagnoses     Acute bilateral low back pain without sciatica    -  Primary    Relevant Medications    ibuprofen (ADVIL/MOTRIN) 800 MG tablet    Other Relevant Orders    MR Lumbar Spine w/o Contrast    [facility] PT, HAND, AND CHIROPRACTIC REFERRAL    Screen for STD (sexually transmitted disease)        Relevant Orders    HIV Antigen Antibody Combo (Completed)    NEISSERIA GONORRHOEA PCR (Completed)    CHLAMYDIA TRACHOMATIS PCR (Completed)    Hepatitis C antibody (Completed)    Treponema Abs w Reflex to RPR and Titer (Completed)    Hospital discharge follow-up               Assess LS spine MRI   Ibuprofen increased to 800 mg tid  Refer to PT  STD screen   Cont treatment       Follow-Up:with results     Musa Florez MD  [facility]

## 2018-09-04 NOTE — TELEPHONE ENCOUNTER
Left voicemail for patient to discuss appointment with Dr. Etienne tomorrow morning. Callback number was left.

## 2018-09-04 NOTE — MR AVS SNAPSHOT
After Visit Summary   9/4/2018    Jose Lantigua    MRN: [id number]           Patient Information     Date Of Birth          1980        Visit Information        Provider Department      9/4/2018 3:20 PM Musa Florez MD [facility]        Today's Diagnoses     Acute bilateral low back pain without sciatica    -  1    Screen for STD (sexually transmitted disease)        Essential hypertension, benign           Follow-ups after your visit        Additional Services     [facility] PT, HAND, AND CHIROPRACTIC REFERRAL       **This order will print in the [facility] Scheduling Office**    Physical Therapy, Hand Therapy and Chiropractic Care are available through:    *[facility]  *[facility]  *[facility]    Call one number to schedule at any of the above locations: [phone].    Your provider has referred you to: Physical Therapy at [facility] or [facility]    Indication/Reason for Referral: Low Back Pain  Onset of Illness: 1 week   Therapy Orders: Evaluate and Treat  Special Programs: None  Special Request: None    Celso Houston      Additional Comments for the Therapist or Chiropractor:     Please be aware that coverage of these services is subject to the terms and limitations of your health insurance plan.  Call member services at your health plan with any benefit or coverage questions.      Please bring the following to your appointment:    *Your personal calendar for scheduling future appointments  *Comfortable clothing                  Your next 10 appointments already scheduled     Sep 05, 2018  7:20 AM CDT   (Arrive by 7:05 AM)   Return Visit with Esdras Etienne MD   [facility] ([facility])    [address] [place] [id number] [phone]              Future tests that were ordered for you today     Open Future Orders        Priority Expected Expires Ordered  "   MR Lumbar Spine w/o Contrast Routine  9/4/2019 9/4/2018            Who to contact     If you have questions or need follow up information about today's clinic visit or your schedule please contact [facility] directly at [phone].  Normal or non-critical lab and imaging results will be communicated to you by [employer], letter or phone within 4 business days after the clinic has received the results. If you do not hear from us within 7 days, please contact the clinic through [employer] or phone. If you have a critical or abnormal lab result, we will notify you by phone as soon as possible.  Submit refill requests through [employer] or call your pharmacy and they will forward the refill request to us. Please allow 3 business days for your refill to be completed.          Additional Information About Your Visit        [employer] Information     [employer] gives you secure access to your electronic health record. If you see a primary care provider, you can also send messages to your care team and make appointments. If you have questions, please call your primary care clinic.  If you do not have a primary care provider, please call [phone] and they will assist you.        Care EveryWhere ID     This is your Care EveryWhere ID. This could be used by other organizations to access your [facility] medical records  [id number]        Your Vitals Were     Pulse Temperature Respirations Height Pulse Oximetry BMI (Body Mass Index)    107 98.2  F (36.8  C) (Oral) 20 5' 10.5\" (1.791 m) 99% 31.54 kg/m2       Blood Pressure from Last 3 Encounters:   09/04/18 120/90   02/16/18 118/78   02/07/18 110/80    Weight from Last 3 Encounters:   09/04/18 223 lb (101.2 kg)   02/16/18 220 lb (99.8 kg)   02/07/18 222 lb 3.2 oz (100.8 kg)              We Performed the Following     Basic metabolic panel     CBC with platelets     CHLAMYDIA TRACHOMATIS PCR     Hepatitis C antibody     HIV Antigen Antibody Combo     [facility] PT, HAND, AND " CHIROPRACTIC REFERRAL     NEISSERIA GONORRHOEA PCR     Treponema Abs w Reflex to RPR and Titer          Today's Medication Changes          These changes are accurate as of 9/4/18  4:21 PM.  If you have any questions, ask your nurse or doctor.               These medicines have changed or have updated prescriptions.        Dose/Directions    * ibuprofen 200 MG tablet   Commonly known as:  ADVIL/MOTRIN   This may have changed:  Another medication with the same name was added. Make sure you understand how and when to take each.   Changed by:  Musa Florez MD        Dose:  200 mg   Take 200 mg by mouth every 4 hours as needed for mild pain   Refills:  0       * ibuprofen 800 MG tablet   Commonly known as:  ADVIL/MOTRIN   This may have changed:  You were already taking a medication with the same name, and this prescription was added. Make sure you understand how and when to take each.   Used for:  Acute bilateral low back pain without sciatica   Changed by:  Musa Florez MD        Dose:  800 mg   Take 1 tablet (800 mg) by mouth every 8 hours as needed for moderate pain   Quantity:  60 tablet   Refills:  1       * Notice:  This list has 2 medication(s) that are the same as other medications prescribed for you. Read the directions carefully, and ask your doctor or other care provider to review them with you.      Stop taking these medicines if you haven't already. Please contact your care team if you have questions.     doxycycline 100 MG capsule   Commonly known as:  VIBRAMYCIN   Stopped by:  Musa Florez MD           guaiFENesin-codeine 100-10 MG/5ML Soln solution   Commonly known as:  ROBITUSSIN AC   Stopped by:  Musa Florez MD                Where to get your medicines      These medications were sent to [facility] [address] [place] [facility] [place], [place] - [address]  [address] [facility] [address]     Phone:  [phone]     ibuprofen 800 MG tablet                Primary Care Provider  Office Phone # Fax #    Musa Florez [phone]       [address] [facility] [address]        Equal Access to Services     FAVIANKELLY JACK : Hadtad jack isaac karlo Sobella, waaxda luqadaha, qaybta kaalmada anthony, elio machuca laBettyjanice phelps. So [facility] [phone].    ATENCIÓN: Si habla español, tiene a silva disposición servicios gratuitos de asistencia lingüística. Llame al [phone].    We comply with applicable federal civil rights laws and [place] laws. We do not discriminate on the basis of race, color, national origin, age, disability, sex, sexual orientation, or gender identity.            Thank you!     Thank you for choosing [facility]  for your care. Our goal is always to provide you with excellent care. Hearing back from our patients is one way we can continue to improve our services. Please take a few minutes to complete the written survey that you may receive in the mail after your visit with us. Thank you!             Your Updated Medication List - Protect others around you: Learn how to safely use, store and throw away your medicines at www.disposemymeds.org.          This list is accurate as of 9/4/18  4:21 PM.  Always use your most recent med list.                   Brand Name Dispense Instructions for use Diagnosis    fluticasone 50 MCG/ACT spray    FLONASE    1 Bottle    Spray 1-2 sprays into both nostrils daily    Acute recurrent maxillary sinusitis       hydrOXYzine 25 MG tablet    ATARAX    30 tablet    Take 1-2 tablets (25-50 mg) by mouth every 6 hours as needed for anxiety    Anxiety       * ibuprofen 200 MG tablet    ADVIL/MOTRIN     Take 200 mg by mouth every 4 hours as needed for mild pain        * ibuprofen 800 MG tablet    ADVIL/MOTRIN    60 tablet    Take 1 tablet (800 mg) by mouth every 8 hours as needed for moderate pain    Acute bilateral low back pain without sciatica       losartan-hydrochlorothiazide 50-12.5 MG per  tablet    HYZAAR    45 tablet    TAKE 1/2 TABLET BY MOUTH DAILY    Essential hypertension, benign       * Notice:  This list has 2 medication(s) that are the same as other medications prescribed for you. Read the directions carefully, and ask your doctor or other care provider to review them with you.

## 2018-09-04 NOTE — LETTER
[facility]  [address], [address]  [place] [address]  [phone]        September 7, 2018    Jose Lantigua  [address] [facility] [address]            Dear Ewelina Rojasgriffin LUNA Rhina:      The results of your recent labs were NORMAL.      If you have any further questions or problems, please contact our office.      Sincerely,        Musa Florez M.D.

## 2018-10-26 NOTE — TELEPHONE ENCOUNTER
Message from MyChart:  Original authorizing provider: Musa Florez MD    Jose Lantigua would like a refill of the following medications:  hydrOXYzine (ATARAX) 25 MG tablet [Musa Florez MD]  losartan-hydrochlorothiazide (HYZAAR) 50-12.5 MG per tablet [Musa Florez MD]    Preferred pharmacy: [facility] [id number] [place], [place] - [address]    Comment:

## 2018-10-29 NOTE — TELEPHONE ENCOUNTER
"Hydroxyzine:  Prescription approved per [facility] Refill Protocol.    Losartan-hydrochlorothiazide:  Routing refill request to provider for review/approval because:  Labs out of range:  CR        Requested Prescriptions   Pending Prescriptions Disp Refills     hydrOXYzine (ATARAX) 25 MG tablet  Last Written Prescription Date:  3/19/18  Last Fill Quantity: 30,  # refills: 1   Last office visit: 9/4/2018 with prescribing provider:  Yes   Future Office Visit:     30 tablet 1     Sig: Take 1-2 tablets (25-50 mg) by mouth every 6 hours as needed for anxiety    Antihistamines Protocol Passed    10/26/2018  9:56 AM       Passed - Recent (12 mo) or future (30 days) visit within the authorizing provider's specialty    Patient had office visit in the last 12 months or has a visit in the next 30 days with authorizing provider or within the authorizing provider's specialty.  See \"Patient Info\" tab in inbasket, or \"Choose Columns\" in Meds & Orders section of the refill encounter.         Passed - Patient is age 3 or older    Apply age and/or weight-based dosing for peds patients age 3 and older.    Forward request to provider for patients under the age of 3.              losartan-hydrochlorothiazide (HYZAAR) 50-12.5 MG per tablet  Last Written Prescription Date:  6/22/18  Last Fill Quantity: 45,  # refills: 0   Last office visit: 9/4/2018 with prescribing provider:  Yes   Future Office Visit:     45 tablet 0    Angiotensin-II Receptors Failed    10/26/2018  9:56 AM       Failed - Blood pressure under 140/90 in past 12 months    BP Readings from Last 3 Encounters:   09/04/18 120/90   02/16/18 118/78   02/07/18 110/80          Failed - Normal serum creatinine on file in past 12 months    Recent Labs   Lab Test  09/04/18   1623   CR  1.27*          Passed - Recent (12 mo) or future (30 days) visit within the authorizing provider's specialty    Patient had office visit in the last 12 months or has a visit in the next 30 days with " "authorizing provider or within the authorizing provider's specialty.  See \"Patient Info\" tab in inbasket, or \"Choose Columns\" in Meds & Orders section of the refill encounter.         Passed - Patient is age 18 or older       Passed - Normal serum potassium on file in past 12 months    Recent Labs   Lab Test  09/04/18   1623   POTASSIUM  3.8               "

## 2018-11-28 NOTE — TELEPHONE ENCOUNTER
Jose Lantigua is a 38 year old male  who calls with abdominal pain.    NURSING ASSESSMENT:  The pain began 1 months ago.    Pain scale (0-10): 4/10    The pain is described as Dull and burning and is located LLQ, which is without radiation.  Symptom associated with the abdominal pain: Pt states he is Urinating frequently in the night strong odor, dark yellow. .  Patient has not had previous abdominal surgery, including none.  Pain is aggravated by nothing, and relieved by nothing.  Allergies:   Allergies   Allergen Reactions     Septra [Sulfamethoxazole W-Trimethoprim] Swelling     Mom told him he swelled.     Cefdinir Hives       MEDICATIONS:   Taking medication(s) as prescribed? Yes  Taking over the counter medication(s)? Yes Taking Advil 1600 mg daily for years   Any medication side effects? Not Applicable    Any barriers to taking medication(s) as prescribed?  N/A  Medication(s) improving/managing symptoms?  No, helping a little.   Medication reconciliation completed: Yes    NURSING PLAN: Nursing advice to patient Scheduled OV    RECOMMENDED DISPOSITION:  See in 72 hours -   Will comply with recommendation: Yes  If further questions/concerns or if symptoms do not improve, worsen or new symptoms develop, call your PCP or [facility] as soon as possible.    Guideline used:  Telephone Triage Protocols for Nurses, Fifth Edition, Cindy Chisholm RN

## 2018-11-30 PROBLEM — M54.50 MIDLINE LOW BACK PAIN WITHOUT SCIATICA, UNSPECIFIED CHRONICITY: Status: ACTIVE | Noted: 2018-01-01

## 2018-11-30 NOTE — MR AVS SNAPSHOT
After Visit Summary   11/30/2018    Jose Lantigua    MRN: [id number]           Patient Information     Date Of Birth          1980        Visit Information        Provider Department      11/30/2018 9:20 AM Musa Florez MD [facility]        Today's Diagnoses     Abdominal pain, left lower quadrant    -  1    Essential hypertension, benign        Midline low back pain without sciatica, unspecified chronicity           Follow-ups after your visit        Future tests that were ordered for you today     Open Future Orders        Priority Expected Expires Ordered    CT Abdomen Pelvis w Contrast Routine  11/30/2019 11/30/2018            Who to contact     If you have questions or need follow up information about today's clinic visit or your schedule please contact [facility] directly at [phone].  Normal or non-critical lab and imaging results will be communicated to you by MyChart, letter or phone within 4 business days after the clinic has received the results. If you do not hear from us within 7 days, please contact the clinic through [employer] or phone. If you have a critical or abnormal lab result, we will notify you by phone as soon as possible.  Submit refill requests through [employer] or call your pharmacy and they will forward the refill request to us. Please allow 3 business days for your refill to be completed.          Additional Information About Your Visit        MyChart Information     [employer] gives you secure access to your electronic health record. If you see a primary care provider, you can also send messages to your care team and make appointments. If you have questions, please call your primary care clinic.  If you do not have a primary care provider, please call [phone] and they will assist you.        Care EveryWhere ID     This is your Care EveryWhere ID. This could be used by other organizations to access your [facility]  "records  [id number]        Your Vitals Were     Pulse Temperature Respirations Height Pulse Oximetry BMI (Body Mass Index)    88 98.1  F (36.7  C) (Oral) 16 5' 10.2\" (1.783 m) 98% 32.39 kg/m2       Blood Pressure from Last 3 Encounters:   11/30/18 124/80   09/04/18 120/90   02/16/18 118/78    Weight from Last 3 Encounters:   11/30/18 227 lb (103 kg)   09/04/18 223 lb (101.2 kg)   02/16/18 220 lb (99.8 kg)              We Performed the Following     *UA reflex to Microscopic     CBC with platelets     Comprehensive metabolic panel     Lipase          Today's Medication Changes          These changes are accurate as of 11/30/18 10:13 AM.  If you have any questions, ask your nurse or doctor.               Start taking these medicines.        Dose/Directions    traMADol 50 MG tablet   Commonly known as:  ULTRAM   Used for:  Abdominal pain, left lower quadrant   Started by:  Musa Florez MD        Dose:  50 mg   Take 1 tablet (50 mg) by mouth every 6 hours as needed for severe pain   Quantity:  30 tablet   Refills:  0            Where to get your medicines      Some of these will need a paper prescription and others can be bought over the counter.  Ask your nurse if you have questions.     Bring a paper prescription for each of these medications     traMADol 50 MG tablet               Information about OPIOIDS     PRESCRIPTION OPIOIDS: WHAT YOU NEED TO KNOW   We gave you an opioid (narcotic) pain medicine. It is important to manage your pain, but opioids are not always the best choice. You should first try all the other options your care team gave you. Take this medicine for as short a time (and as few doses) as possible.    Some activities can increase your pain, such as bandage changes or therapy sessions. It may help to take your pain medicine 30 to 60 minutes before these activities. Reduce your stress by getting enough sleep, working on hobbies you enjoy and practicing relaxation or meditation. Talk to your " care team about ways to manage your pain beyond prescription opioids.    These medicines have risks:    DO NOT drive when on new or higher doses of pain medicine. These medicines can affect your alertness and reaction times, and you could be arrested for driving under the influence (DUI). If you need to use opioids long-term, talk to your care team about driving.    DO NOT operate heavy machinery    DO NOT do any other dangerous activities while taking these medicines.    DO NOT drink any alcohol while taking these medicines.     If the opioid prescribed includes acetaminophen, DO NOT take with any other medicines that contain acetaminophen. Read all labels carefully. Look for the word  acetaminophen  or  Tylenol.  Ask your pharmacist if you have questions or are unsure.    You can get addicted to pain medicines, especially if you have a history of addiction (chemical, alcohol or substance dependence). Talk to your care team about ways to reduce this risk.    All opioids tend to cause constipation. Drink plenty of water and eat foods that have a lot of fiber, such as fruits, vegetables, prune juice, apple juice and high-fiber cereal. Take a laxative (Miralax, milk of magnesia, Colace, Senna) if you don t move your bowels at least every other day. Other side effects include upset stomach, sleepiness, dizziness, throwing up, tolerance (needing more of the medicine to have the same effect), physical dependence and slowed breathing.    Store your pills in a secure place, locked if possible. We will not replace any lost or stolen medicine. If you don t finish your medicine, please throw away (dispose) as directed by your pharmacist. The [place] [employer]ution [employer] has more information about safe disposal: [email or web address]         Primary Care Provider Office Phone # Fax #    Musa Florez [phone]       [address] [place]  [place] [address]        Equal Access to Services     [facility] : Hadii jack isaac rani Bowling, waaxda luqadaha, qaybta kaalmada anthony, elio phelps. So [facility] [phone].    ATENCIÓN: Si habla español, tiene a silva disposición servicios gratuitos de asistencia lingüística. Tenisha al [phone].    We comply with applicable federal civil rights laws and [place] laws. We do not discriminate on the basis of race, color, national origin, age, disability, sex, sexual orientation, or gender identity.            Thank you!     Thank you for choosing [facility]  for your care. Our goal is always to provide you with excellent care. Hearing back from our patients is one way we can continue to improve our services. Please take a few minutes to complete the written survey that you may receive in the mail after your visit with us. Thank you!             Your Updated Medication List - Protect others around you: Learn how to safely use, store and throw away your medicines at www.disposemymeds.org.          This list is accurate as of 11/30/18 10:13 AM.  Always use your most recent med list.                   Brand Name Dispense Instructions for use Diagnosis    [employer]ILDREN Chew      Take by mouth 2 times daily        hydrOXYzine 25 MG tablet    ATARAX    30 tablet    Take 1-2 tablets (25-50 mg) by mouth every 6 hours as needed for anxiety    Anxiety       * ibuprofen 200 MG tablet    ADVIL/MOTRIN     Take 200 mg by mouth every 4 hours as needed for mild pain        * ibuprofen 800 MG tablet    ADVIL/MOTRIN    60 tablet    Take 1 tablet (800 mg) by mouth every 8 hours as needed for moderate pain    Acute bilateral low back pain without sciatica       losartan-hydrochlorothiazide 50-12.5 MG tablet    HYZAAR    90 tablet    Take 1 tablet by mouth daily    Essential hypertension, benign       traMADol 50 MG tablet    ULTRAM    30 tablet    Take 1 tablet (50 mg) by mouth  every 6 hours as needed for severe pain    Abdominal pain, left lower quadrant       * Notice:  This list has 2 medication(s) that are the same as other medications prescribed for you. Read the directions carefully, and ask your doctor or other care provider to review them with you.

## 2018-11-30 NOTE — LETTER
December 4, 2018      Jose Lantigua  [address] [place] [facility] [address]        Dear ,    We are writing to inform you of your test results.    Normal result reviewed,   Slightly low platelets. Follow up in 6 months.    Resulted Orders   CBC with platelets   Result Value Ref Range    WBC 5.8 4.0 - 11.0 10e9/L    RBC Count 5.26 4.4 - 5.9 10e12/L    Hemoglobin 15.2 13.3 - 17.7 g/dL    Hematocrit 45.1 40.0 - 53.0 %    MCV 86 78 - 100 fl    MCH 28.9 26.5 - 33.0 pg    MCHC 33.7 31.5 - 36.5 g/dL    RDW 13.9 10.0 - 15.0 %    Platelet Count 123 (L) 150 - 450 10e9/L      Comment:      Reviewed: OK with previous   Comprehensive metabolic panel   Result Value Ref Range    Sodium 138 133 - 144 mmol/L    Potassium 4.0 3.4 - 5.3 mmol/L    Chloride 105 94 - 109 mmol/L    Carbon Dioxide 28 20 - 32 mmol/L    Anion Gap 5 3 - 14 mmol/L    Glucose 97 70 - 99 mg/dL      Comment:      Non Fasting    Urea Nitrogen 27 7 - 30 mg/dL    Creatinine 1.27 (H) 0.66 - 1.25 mg/dL    GFR Estimate 63 >60 mL/min/1.7m2      Comment:      Non  GFR Calc    GFR Estimate If Black 76 >60 mL/min/1.7m2      Comment:       GFR Calc    Calcium 9.4 8.5 - 10.1 mg/dL    Bilirubin Total 0.5 0.2 - 1.3 mg/dL    Albumin 3.8 3.4 - 5.0 g/dL    Protein Total 7.4 6.8 - 8.8 g/dL    Alkaline Phosphatase 68 40 - 150 U/L    ALT 51 0 - 70 U/L    AST 23 0 - 45 U/L   *UA reflex to Microscopic   Result Value Ref Range    Color Urine Yellow     Appearance Urine Clear     Glucose Urine Negative NEG^Negative mg/dL    Bilirubin Urine Negative NEG^Negative    Ketones Urine Negative NEG^Negative mg/dL    Specific Gravity Urine 1.025 1.003 - 1.035    Blood Urine Negative NEG^Negative    pH Urine 5.5 5.0 - 7.0 pH    Protein Albumin Urine Negative NEG^Negative mg/dL    Urobilinogen Urine 0.2 0.2 - 1.0 EU/dL    Nitrite Urine Negative NEG^Negative    Leukocyte Esterase Urine Negative NEG^Negative    Source Midstream Urine    Lipase    Result Value Ref Range    Lipase 153 73 - 393 U/L       If you have any questions or concerns, please call the clinic at the number listed above.       Sincerely,        Musa Florez MD

## 2018-11-30 NOTE — PROGRESS NOTES
SUBJECTIVE:   Jose Lantigua is a 38 year old male who presents to clinic today for the following health issues:    Left abdominal pain x years but getting worse.    Presents with LLQ abdominal pain.   Has had symptoms for 2 years. Gradually progressive. Pain is constant , low to moderate grade - 4/10. Radiates sometimes to the left inguinal area, no bulging.   Non change in bowel habits, no hematuria or dysuria.   No weight loss, fever.   Has h/o HTN. on medical treatment. BP has been controlled. No side effects from medications. No CP, HA, dizziness. good compliance with medications and low salt diet.  Has h/o chronic LBP. No change. Had improvement with PT. Has not had MRI.         Problem list and histories reviewed & adjusted, as indicated.  Additional history: as documented    Patient Active Problem List   Diagnosis     CARDIOVASCULAR SCREENING; LDL GOAL LESS THAN 160     Essential hypertension, benign     Fracture of acromial end of left clavicle     Bladder disorder, other     Clavicle fracture     Anxiety     Hypothyroidism     Midline low back pain without sciatica, unspecified chronicity     Past Surgical History:   Procedure Laterality Date     ARTHROSCOPIC REPAIR MEDIAL COLLATERAL LIGAMENT  6/26/2012    Procedure: ARTHROSCOPIC REPAIR MEDIAL COLLATERAL LIGAMENT;  Right Knee Arthroscopic Loose Body Removal, Posterior Lateral Corner Reconstruction With Allograft ;  Surgeon: Esdras Etienne MD;  [facility]      TOOTH EXTRACTION W/FORCEP       NO HISTORY OF SURGERY       OPEN REDUCTION INTERNAL FIXATION CLAVICLE Left 11/20/2015    Procedure: OPEN REDUCTION INTERNAL FIXATION CLAVICLE;  Surgeon: Esdras Etienne MD;  Location:  OR       Social History   Substance Use Topics     Smoking status: Former Smoker     Packs/day: 1.50     Years: 2.00     Quit date: 6/11/2003     Smokeless tobacco: Never Used     Alcohol use 3.5 oz/week     7 Cans of beer per week      Comment:  "rarely     Family History   Problem Relation Age of Onset     Adopted: Yes     Family History Negative Mother      Family History Negative Father          Current Outpatient Prescriptions   Medication Sig Dispense Refill     [employer]ILDR[employer] CHEW Take by mouth 2 times daily       hydrOXYzine (ATARAX) 25 MG tablet Take 1-2 tablets (25-50 mg) by mouth every 6 hours as needed for anxiety 30 tablet 1     ibuprofen (ADVIL,MOTRIN) 200 MG tablet Take 200 mg by mouth every 4 hours as needed for mild pain       ibuprofen (ADVIL/MOTRIN) 800 MG tablet Take 1 tablet (800 mg) by mouth every 8 hours as needed for moderate pain 60 tablet 1     losartan-hydrochlorothiazide (HYZAAR) 50-12.5 MG per tablet Take 1 tablet by mouth daily 90 tablet 3     traMADol (ULTRAM) 50 MG tablet Take 1 tablet (50 mg) by mouth every 6 hours as needed for severe pain 30 tablet 0       Reviewed and updated as needed this visit by clinical staff  Tobacco  Allergies  Meds  Med Hx  Surg Hx  Fam Hx  Soc Hx      Reviewed and updated as needed this visit by Provider         ROS:  Constitutional, HEENT, cardiovascular, pulmonary, gi and gu systems are negative, except as otherwise noted.    OBJECTIVE:     /80 (BP Location: Left arm, Patient Position: Chair, Cuff Size: Adult Large)  Pulse 88  Temp 98.1  F (36.7  C) (Oral)  Resp 16  Ht 5' 10.2\" (1.783 m)  Wt 227 lb (103 kg)  SpO2 98%  BMI 32.39 kg/m2  Body mass index is 32.39 kg/(m^2).   GENERAL: healthy, alert and no distress  NECK: no adenopathy, no asymmetry, masses, or scars and thyroid normal to palpation  RESP: lungs clear to auscultation - no rales, rhonchi or wheezes  CV: regular rate and rhythm, normal S1 S2, no S3 or S4, no murmur, click or rub, no peripheral edema and peripheral pulses strong  ABDOMEN: soft, nontender, no hepatosplenomegaly, no masses and bowel sounds normal  MS: no gross musculoskeletal defects noted, no edema    Diagnostic Test Results:  none "     ASSESSMENT/PLAN:     Problem List Items Addressed This Visit     Essential hypertension, benign    Midline low back pain without sciatica, unspecified chronicity    Relevant Medications    traMADol (ULTRAM) 50 MG tablet      Other Visit Diagnoses     Abdominal pain, left lower quadrant    -  Primary    Relevant Medications    traMADol (ULTRAM) 50 MG tablet    Other Relevant Orders    CT Abdomen Pelvis w Contrast    CBC with platelets (Completed)    Comprehensive metabolic panel (Completed)    *UA reflex to Microscopic (Completed)    Lipase (Completed)           Assess lab work and CT abdomen   Cont treatment   Controlled HTN   PRN Tramadol, Tylenol. Advised for side effects     Follow-Up:with results     Musa Florez MD  [facility]

## 2018-12-10 NOTE — TELEPHONE ENCOUNTER
Reason for Call:  Other call back    Detailed comments: pt. Had ct on 12/5, wnted to schedule follow up appt. With dr. roth to see what he can do.  Dr. Roth booked up until 1/7 .   What should he do?    Phone Number Patient can be reached at: Cell number on file:    Telephone Information:   Mobile [phone]       Best Time: asap    Can we leave a detailed message on this number? YES    Call taken on 12/10/2018 at 1:41 PM by Mercedez Howell

## 2018-12-14 NOTE — NURSING NOTE
"Vital signs:  Temp: 97.8  F (36.6  C) Temp src: Oral BP: 108/68 Pulse: 98     SpO2: 99 %     Height: 178.4 cm (5' 10.25\") Weight: 103.9 kg (229 lb)  Estimated body mass index is 32.62 kg/m  as calculated from the following:    Height as of this encounter: 1.784 m (5' 10.25\").    Weight as of this encounter: 103.9 kg (229 lb).          "

## 2018-12-14 NOTE — PROGRESS NOTES
SUBJECTIVE:   Jose Lantigua is a 38 year old male who presents to clinic today for the following health issues:      Abdominal pain F/U:    Has recurrent left inguinal LLQ pain, mild to moderate. Abdominal CT - no pathology , incidental mild splenomegaly, umbilical small hernia.   Has had normal lab work, UA.   No dysuria, no change in BMs, has h/o constipation, taking fiber supplement.   Has h/o HTN. on medical treatment. BP has been controlled. No side effects from medications. No CP, HA, dizziness. good compliance with medications and low salt diet.  Has chronic LBP, DDD, on Ibuprofen and occasionally Tramadol, uses 1-2 per month. Medication worsens constipation and he is avoiding it.       Problem list and histories reviewed & adjusted, as indicated.  Additional history: as documented    Patient Active Problem List   Diagnosis     CARDIOVASCULAR SCREENING; LDL GOAL LESS THAN 160     Essential hypertension, benign     Fracture of acromial end of left clavicle     Bladder disorder, other     Clavicle fracture     Anxiety     Hypothyroidism     Midline low back pain without sciatica, unspecified chronicity     Past Surgical History:   Procedure Laterality Date     ARTHROSCOPIC REPAIR MEDIAL COLLATERAL LIGAMENT  6/26/2012    Procedure: ARTHROSCOPIC REPAIR MEDIAL COLLATERAL LIGAMENT;  Right Knee Arthroscopic Loose Body Removal, Posterior Lateral Corner Reconstruction With Allograft ;  Surgeon: Esdras Etienne MD;  Location:  OR      TOOTH EXTRACTION W/FORCEP       NO HISTORY OF SURGERY       OPEN REDUCTION INTERNAL FIXATION CLAVICLE Left 11/20/2015    Procedure: OPEN REDUCTION INTERNAL FIXATION CLAVICLE;  Surgeon: Esdras Etienne MD;  Location:  OR       Social History     Tobacco Use     Smoking status: Former Smoker     Packs/day: 1.50     Years: 2.00     Pack years: 3.00     Last attempt to quit: 6/11/2003     Years since quitting: 15.5     Smokeless tobacco: Never Used  "  Substance Use Topics     Alcohol use: Yes     Alcohol/week: 3.5 oz     Types: 7 Cans of beer per week     Comment: rarely     Family History   Adopted: Yes   Problem Relation Age of Onset     Family History Negative Mother      Family History Negative Father          Current Outpatient Medications   Medication Sig Dispense Refill     [employer]ILDR[employer] CHEW Take by mouth 2 times daily       hydrOXYzine (ATARAX) 25 MG tablet Take 1-2 tablets (25-50 mg) by mouth every 6 hours as needed for anxiety 30 tablet 1     ibuprofen (ADVIL,MOTRIN) 200 MG tablet Take 200 mg by mouth every 4 hours as needed for mild pain       ibuprofen (ADVIL/MOTRIN) 800 MG tablet Take 1 tablet (800 mg) by mouth every 8 hours as needed for moderate pain 60 tablet 1     losartan-hydrochlorothiazide (HYZAAR) 50-12.5 MG per tablet Take 1 tablet by mouth daily 90 tablet 3     traMADol (ULTRAM) 50 MG tablet Take 1 tablet (50 mg) by mouth every 6 hours as needed for severe pain 30 tablet 0       Reviewed and updated as needed this visit by clinical staff       Reviewed and updated as needed this visit by Provider         ROS:  Constitutional, HEENT, cardiovascular, pulmonary, gi and gu systems are negative, except as otherwise noted.    OBJECTIVE:     /68   Pulse 98   Temp 97.8  F (36.6  C) (Oral)   Ht 1.784 m (5' 10.25\")   Wt 103.9 kg (229 lb)   SpO2 99%   BMI 32.62 kg/m    Body mass index is 32.62 kg/m .   GENERAL: healthy, alert and no distress  EYES: Eyes grossly normal to inspection, PERRL and conjunctivae and sclerae normal  HENT: ear canals and TM's normal, nose and mouth without ulcers or lesions  NECK: no adenopathy, no asymmetry, masses, or scars and thyroid normal to palpation  RESP: lungs clear to auscultation - no rales, rhonchi or wheezes  CV: regular rate and rhythm, normal S1 S2, no S3 or S4, no murmur, click or rub, no peripheral edema and peripheral pulses strong  ABDOMEN: soft, nontender, no " hepatosplenomegaly, no masses and bowel sounds normal  MS: no gross musculoskeletal defects noted, no edema  SKIN: no suspicious lesions or rashes  NEURO: Normal strength and tone, mentation intact and speech normal    Diagnostic Test Results:  none     ASSESSMENT/PLAN:     Problem List Items Addressed This Visit     Essential hypertension, benign    Midline low back pain without sciatica, unspecified chronicity      Other Visit Diagnoses     Abdominal pain, left lower quadrant    -  Primary    Splenomegaly               Possible musculosceletal pain , PRN Ibuprofen, reassess if changes  Possible constipation related. Cont fibers   Possible relation to DDD, radiation .   Recheck in 3 months for spleen enlargement, clinical exam was normal   Cont current medications       Follow-Up:in 3 months     Musa Florez MD  [facility]

## 2019-01-01 ENCOUNTER — OFFICE VISIT (OUTPATIENT)
Dept: INTERNAL MEDICINE | Facility: CLINIC | Age: 39
End: 2019-01-01
Payer: COMMERCIAL

## 2019-01-01 ENCOUNTER — HOSPITAL ENCOUNTER (OUTPATIENT)
Dept: MRI IMAGING | Facility: CLINIC | Age: 39
End: 2019-03-11
Attending: NURSE PRACTITIONER
Payer: COMMERCIAL

## 2019-01-01 ENCOUNTER — HOSPITAL ENCOUNTER (OUTPATIENT)
Dept: MRI IMAGING | Facility: CLINIC | Age: 39
Discharge: HOME | End: 2019-03-11
Attending: NURSE PRACTITIONER | Admitting: NURSE PRACTITIONER
Payer: COMMERCIAL

## 2019-01-01 VITALS
OXYGEN SATURATION: 97 % | WEIGHT: 217 LBS | DIASTOLIC BLOOD PRESSURE: 70 MMHG | BODY MASS INDEX: 31.07 KG/M2 | TEMPERATURE: 98.4 F | HEIGHT: 70 IN | SYSTOLIC BLOOD PRESSURE: 102 MMHG | RESPIRATION RATE: 16 BRPM | HEART RATE: 62 BPM

## 2019-01-01 DIAGNOSIS — S09.90XS CLOSED HEAD INJURY, SEQUELA: ICD-10-CM

## 2019-01-01 DIAGNOSIS — R20.0 BILATERAL ARM NUMBNESS AND TINGLING WHILE SLEEPING: ICD-10-CM

## 2019-01-01 DIAGNOSIS — R20.0 ARM NUMBNESS: ICD-10-CM

## 2019-01-01 DIAGNOSIS — R41.3 MEMORY LOSS OF UNKNOWN CAUSE: ICD-10-CM

## 2019-01-01 DIAGNOSIS — R20.2 BILATERAL ARM NUMBNESS AND TINGLING WHILE SLEEPING: ICD-10-CM

## 2019-01-01 DIAGNOSIS — M54.2 NECK PAIN: Primary | ICD-10-CM

## 2019-01-01 DIAGNOSIS — R41.3 MEMORY LOSS OF UNKNOWN CAUSE: Primary | ICD-10-CM

## 2019-01-01 DIAGNOSIS — I10 ESSENTIAL HYPERTENSION, BENIGN: ICD-10-CM

## 2019-01-01 DIAGNOSIS — M48.02 CERVICAL STENOSIS OF SPINAL CANAL: ICD-10-CM

## 2019-01-01 PROCEDURE — 99214 OFFICE O/P EST MOD 30 MIN: CPT | Performed by: NURSE PRACTITIONER

## 2019-01-01 PROCEDURE — 70544 MR ANGIOGRAPHY HEAD W/O DYE: CPT

## 2019-01-01 PROCEDURE — 70553 MRI BRAIN STEM W/O & W/DYE: CPT

## 2019-01-01 PROCEDURE — 72141 MRI NECK SPINE W/O DYE: CPT

## 2019-01-01 PROCEDURE — 25500064 ZZH RX 255 OP 636: Performed by: NURSE PRACTITIONER

## 2019-01-01 PROCEDURE — A9585 GADOBUTROL INJECTION: HCPCS | Performed by: NURSE PRACTITIONER

## 2019-01-01 RX ORDER — GADOBUTROL 604.72 MG/ML
10 INJECTION INTRAVENOUS ONCE
Status: COMPLETED | OUTPATIENT
Start: 2019-01-01 | End: 2019-01-01

## 2019-01-01 RX ORDER — LOSARTAN POTASSIUM AND HYDROCHLOROTHIAZIDE 12.5; 5 MG/1; MG/1
1 TABLET ORAL DAILY
Qty: 90 TABLET | Refills: 3 | Status: SHIPPED | OUTPATIENT
Start: 2019-01-01

## 2019-01-01 RX ADMIN — GADOBUTROL 10 ML: 604.72 INJECTION INTRAVENOUS at 14:40

## 2019-01-01 ASSESSMENT — MIFFLIN-ST. JEOR: SCORE: 1909.53

## 2019-03-06 NOTE — PATIENT INSTRUCTIONS
Your provider has referred you for the following:   Make an appointment with neurology ASAP   Consult at [facility]: [facility]  [place]is [place] [phone]   [email or web address]  [place] [phone]   [email or web address]    MRI MRA  And neck   They will call you to set up   [phone] to schedule
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
## 2019-03-06 NOTE — NURSING NOTE
"Chief Complaint   Patient presents with     Memory Loss     pt states has has memory issues gradually getting worse over the last year. pt has had several accidents of hitting his head.pt states has never been unconcious.     initial /70   Pulse 62   Temp 98.4  F (36.9  C) (Oral)   Resp 16   Ht 1.784 m (5' 10.25\")   Wt 98.4 kg (217 lb)   SpO2 97%   BMI 30.92 kg/m   Estimated body mass index is 30.92 kg/m  as calculated from the following:    Height as of this encounter: 1.784 m (5' 10.25\").    Weight as of this encounter: 98.4 kg (217 lb)..  bp completed using cuff size large  SUMEET VALERIO LPN  "

## 2019-09-29 ENCOUNTER — HEALTH MAINTENANCE LETTER (OUTPATIENT)
Age: 39
End: 2019-09-29